# Patient Record
Sex: FEMALE | Race: ASIAN | NOT HISPANIC OR LATINO
[De-identification: names, ages, dates, MRNs, and addresses within clinical notes are randomized per-mention and may not be internally consistent; named-entity substitution may affect disease eponyms.]

---

## 2021-08-23 ENCOUNTER — NON-APPOINTMENT (OUTPATIENT)
Age: 51
End: 2021-08-23

## 2021-08-23 ENCOUNTER — APPOINTMENT (OUTPATIENT)
Dept: CARDIOLOGY | Facility: CLINIC | Age: 51
End: 2021-08-23
Payer: COMMERCIAL

## 2021-08-23 VITALS
TEMPERATURE: 97.6 F | RESPIRATION RATE: 16 BRPM | SYSTOLIC BLOOD PRESSURE: 179 MMHG | OXYGEN SATURATION: 99 % | HEART RATE: 60 BPM | HEIGHT: 64 IN | DIASTOLIC BLOOD PRESSURE: 92 MMHG | BODY MASS INDEX: 24.24 KG/M2 | WEIGHT: 142 LBS

## 2021-08-23 DIAGNOSIS — R07.89 OTHER CHEST PAIN: ICD-10-CM

## 2021-08-23 DIAGNOSIS — R00.2 PALPITATIONS: ICD-10-CM

## 2021-08-23 PROCEDURE — 93306 TTE W/DOPPLER COMPLETE: CPT

## 2021-08-23 PROCEDURE — 93000 ELECTROCARDIOGRAM COMPLETE: CPT

## 2021-08-23 PROCEDURE — 99204 OFFICE O/P NEW MOD 45 MIN: CPT | Mod: 25

## 2021-08-25 ENCOUNTER — NON-APPOINTMENT (OUTPATIENT)
Age: 51
End: 2021-08-25

## 2021-08-29 PROBLEM — R00.2 PALPITATIONS: Status: ACTIVE | Noted: 2021-08-23

## 2021-08-29 PROBLEM — R07.89 CHEST DISCOMFORT: Status: ACTIVE | Noted: 2021-08-29

## 2021-08-29 NOTE — REASON FOR VISIT
[FreeTextEntry1] : 50 year-old female with HTN presents for evaluation of dizziness. Patient reports that last week she had 2 episodes of sudden dizziness lasting seconds and  then felt anxiety and weakness following for 2-3 days with palpitations and felt brain fog. Patient denies nausea/vomiting. Her BP at the time was 150. Patient was feeling fine and had been doing some home renovations and was very busy. Patient started to take Losartan 25 mg daily and is a bit better but still feeling dizzy. She feels dizzy when she lies down. Patient denies CP. Patient denies SOB. Patient reports palpitations. She reports having an episode of panic attack 5 years ago and has been on Paxil 10 mg.  Patient denies h/o syncope. Patient saw PCP who prescribed Losartan 50 mg. Patient likely has inner ear imbalance. ECG was done for lightheadedness. I advised patient to undergo an echocardiogram. I advised patient to wear a Holter monitor.

## 2021-12-18 ENCOUNTER — NON-APPOINTMENT (OUTPATIENT)
Age: 51
End: 2021-12-18

## 2023-08-16 PROBLEM — I10 HTN (HYPERTENSION): Status: ACTIVE | Noted: 2021-08-29

## 2023-08-16 RX ORDER — LOSARTAN POTASSIUM 25 MG/1
25 TABLET, FILM COATED ORAL
Refills: 0 | Status: ACTIVE | COMMUNITY

## 2023-08-16 RX ORDER — PAROXETINE HYDROCHLORIDE 10 MG/1
10 TABLET, FILM COATED ORAL
Refills: 0 | Status: ACTIVE | COMMUNITY

## 2023-08-16 NOTE — REASON FOR VISIT
[FreeTextEntry1] : 50 year-old female with HTN presents for followup.    Patient was last seen on 8/23/21 for evaluation of dizziness.  Patient underwent an echocardiogram and it showed normal LV function without significant valvular pathology.  I advised patient to wear a Holter monitor.  Patient wore a Holter and it showed rare APC's, rare PVC's, without significant arrhythmia.

## 2023-08-16 NOTE — HISTORY OF PRESENT ILLNESS
[FreeTextEntry1] : 8/23/21 - Patient reports that last week she had 2 episodes of sudden dizziness lasting seconds and then felt anxiety and weakness following for 2-3 days with palpitations and felt brain fog. Patient denies nausea/vomiting. Her BP at the time was 150. Patient was feeling fine and had been doing some home renovations and was very busy. Patient started to take Losartan 25 mg daily and is a bit better but still feeling dizzy. She feels dizzy when she lies down. Patient denies CP. Patient denies SOB. Patient reports palpitations. She reports having an episode of panic attack 5 years ago and has been on Paxil 10 mg.  Patient denies h/o syncope. Patient saw PCP who prescribed Losartan 50 mg. Patient likely has inner ear imbalance. ECG was done for lightheadedness. I advised patient to undergo an echocardiogram.  Patient underwent an echocardiogram and it showed normal LV function without significant valvular pathology.  I advised patient to wear a Holter monitor.  Patient wore a Holter and it showed rare APC's, rare PVC's, without significant arrhythmia.

## 2023-08-18 ENCOUNTER — APPOINTMENT (OUTPATIENT)
Dept: CARDIOLOGY | Facility: CLINIC | Age: 53
End: 2023-08-18
Payer: COMMERCIAL

## 2023-08-18 ENCOUNTER — NON-APPOINTMENT (OUTPATIENT)
Age: 53
End: 2023-08-18

## 2023-08-18 VITALS
TEMPERATURE: 97.3 F | WEIGHT: 138 LBS | HEART RATE: 55 BPM | RESPIRATION RATE: 18 BRPM | DIASTOLIC BLOOD PRESSURE: 89 MMHG | BODY MASS INDEX: 23.69 KG/M2 | OXYGEN SATURATION: 99 % | SYSTOLIC BLOOD PRESSURE: 144 MMHG

## 2023-08-18 DIAGNOSIS — R06.02 SHORTNESS OF BREATH: ICD-10-CM

## 2023-08-18 DIAGNOSIS — I10 ESSENTIAL (PRIMARY) HYPERTENSION: ICD-10-CM

## 2023-08-18 PROCEDURE — 99214 OFFICE O/P EST MOD 30 MIN: CPT | Mod: 25

## 2023-08-18 PROCEDURE — 93000 ELECTROCARDIOGRAM COMPLETE: CPT

## 2023-11-07 ENCOUNTER — APPOINTMENT (OUTPATIENT)
Dept: CARDIOLOGY | Facility: CLINIC | Age: 53
End: 2023-11-07
Payer: COMMERCIAL

## 2023-11-07 VITALS
OXYGEN SATURATION: 100 % | SYSTOLIC BLOOD PRESSURE: 109 MMHG | RESPIRATION RATE: 18 BRPM | HEART RATE: 60 BPM | DIASTOLIC BLOOD PRESSURE: 71 MMHG | TEMPERATURE: 97.1 F

## 2023-11-07 PROCEDURE — 93306 TTE W/DOPPLER COMPLETE: CPT
